# Patient Record
Sex: FEMALE | Race: WHITE | NOT HISPANIC OR LATINO | Employment: OTHER | URBAN - METROPOLITAN AREA
[De-identification: names, ages, dates, MRNs, and addresses within clinical notes are randomized per-mention and may not be internally consistent; named-entity substitution may affect disease eponyms.]

---

## 2023-04-13 ENCOUNTER — TELEPHONE (OUTPATIENT)
Dept: OTOLARYNGOLOGY | Facility: CLINIC | Age: 68
End: 2023-04-13
Payer: MEDICARE

## 2023-04-14 ENCOUNTER — OFFICE VISIT (OUTPATIENT)
Dept: OTOLARYNGOLOGY | Facility: CLINIC | Age: 68
End: 2023-04-14
Payer: MEDICARE

## 2023-04-14 VITALS — WEIGHT: 164 LBS

## 2023-04-14 DIAGNOSIS — R49.0 HOARSENESS: ICD-10-CM

## 2023-04-14 DIAGNOSIS — D86.89 NASAL SARCOIDOSIS: Primary | ICD-10-CM

## 2023-04-14 PROCEDURE — 31231 PR NASAL ENDOSCOPY, DX: ICD-10-PCS | Mod: S$PBB,,, | Performed by: OTOLARYNGOLOGY

## 2023-04-14 PROCEDURE — 31231 NASAL ENDOSCOPY DX: CPT | Mod: S$PBB,,, | Performed by: OTOLARYNGOLOGY

## 2023-04-14 PROCEDURE — 99204 OFFICE O/P NEW MOD 45 MIN: CPT | Mod: S$PBB,25,, | Performed by: OTOLARYNGOLOGY

## 2023-04-14 PROCEDURE — 99999 PR PBB SHADOW E&M-EST. PATIENT-LVL II: CPT | Mod: PBBFAC,,, | Performed by: OTOLARYNGOLOGY

## 2023-04-14 PROCEDURE — 99999 PR PBB SHADOW E&M-EST. PATIENT-LVL II: ICD-10-PCS | Mod: PBBFAC,,, | Performed by: OTOLARYNGOLOGY

## 2023-04-14 PROCEDURE — 99204 PR OFFICE/OUTPT VISIT, NEW, LEVL IV, 45-59 MIN: ICD-10-PCS | Mod: S$PBB,25,, | Performed by: OTOLARYNGOLOGY

## 2023-04-14 PROCEDURE — 87070 CULTURE OTHR SPECIMN AEROBIC: CPT | Performed by: OTOLARYNGOLOGY

## 2023-04-14 PROCEDURE — 87186 SC STD MICRODIL/AGAR DIL: CPT | Mod: 59 | Performed by: OTOLARYNGOLOGY

## 2023-04-14 PROCEDURE — 31231 NASAL ENDOSCOPY DX: CPT | Mod: PBBFAC,PN | Performed by: OTOLARYNGOLOGY

## 2023-04-14 PROCEDURE — 87077 CULTURE AEROBIC IDENTIFY: CPT | Mod: 59 | Performed by: OTOLARYNGOLOGY

## 2023-04-14 PROCEDURE — 99212 OFFICE O/P EST SF 10 MIN: CPT | Mod: PBBFAC,PN,25 | Performed by: OTOLARYNGOLOGY

## 2023-04-14 RX ORDER — NITROFURANTOIN 25; 75 MG/1; MG/1
CAPSULE ORAL
COMMUNITY

## 2023-04-14 RX ORDER — POTASSIUM CHLORIDE 750 MG/1
TABLET, EXTENDED RELEASE ORAL
COMMUNITY

## 2023-04-14 RX ORDER — LEFLUNOMIDE 20 MG/1
TABLET ORAL
COMMUNITY

## 2023-04-14 RX ORDER — ESTRADIOL 0.1 MG/G
2 CREAM VAGINAL
COMMUNITY

## 2023-04-14 RX ORDER — HYDROCODONE BITARTRATE AND ACETAMINOPHEN 7.5; 325 MG/1; MG/1
TABLET ORAL
COMMUNITY

## 2023-04-14 RX ORDER — ALPRAZOLAM 1 MG/1
TABLET ORAL
COMMUNITY

## 2023-04-14 RX ORDER — GABAPENTIN 300 MG/1
CAPSULE ORAL
COMMUNITY

## 2023-04-14 RX ORDER — MUPIROCIN 20 MG/G
OINTMENT TOPICAL
COMMUNITY

## 2023-04-14 RX ORDER — TERBINAFINE HYDROCHLORIDE 250 MG/1
TABLET ORAL
COMMUNITY

## 2023-04-14 RX ORDER — DULOXETIN HYDROCHLORIDE 60 MG/1
CAPSULE, DELAYED RELEASE ORAL
COMMUNITY
Start: 2023-04-10

## 2023-04-14 RX ORDER — CILOSTAZOL 50 MG/1
TABLET ORAL
COMMUNITY

## 2023-04-14 RX ORDER — TRAMADOL HYDROCHLORIDE 50 MG/1
TABLET ORAL
COMMUNITY
Start: 2022-11-16

## 2023-04-14 RX ORDER — MECLIZINE HYDROCHLORIDE 25 MG/1
TABLET ORAL
COMMUNITY

## 2023-04-14 RX ORDER — ALBUTEROL SULFATE 90 UG/1
2 AEROSOL, METERED RESPIRATORY (INHALATION) EVERY 6 HOURS PRN
COMMUNITY
Start: 2023-01-10

## 2023-04-14 RX ORDER — OXYCODONE AND ACETAMINOPHEN 5; 325 MG/1; MG/1
TABLET ORAL
COMMUNITY

## 2023-04-14 RX ORDER — CYCLOBENZAPRINE HCL 10 MG
TABLET ORAL
COMMUNITY

## 2023-04-14 RX ORDER — DULOXETIN HYDROCHLORIDE 20 MG/1
CAPSULE, DELAYED RELEASE ORAL
COMMUNITY

## 2023-04-14 RX ORDER — TRIAMTERENE/HYDROCHLOROTHIAZID 37.5-25 MG
TABLET ORAL
COMMUNITY

## 2023-04-14 RX ORDER — HYOSCYAMINE SULFATE 0.12 MG/1
TABLET SUBLINGUAL
COMMUNITY

## 2023-04-14 RX ORDER — IBUPROFEN 200 MG
200 TABLET ORAL EVERY 8 HOURS PRN
COMMUNITY

## 2023-04-14 RX ORDER — TRAZODONE HYDROCHLORIDE 100 MG/1
TABLET ORAL
COMMUNITY
Start: 2022-06-29

## 2023-04-14 RX ORDER — METHYLPREDNISOLONE 4 MG/1
TABLET ORAL
COMMUNITY

## 2023-04-14 RX ORDER — BENZONATATE 200 MG/1
CAPSULE ORAL
COMMUNITY

## 2023-04-14 RX ORDER — TRIAMTERENE AND HYDROCHLOROTHIAZIDE 75; 50 MG/1; MG/1
TABLET ORAL
COMMUNITY
Start: 2023-04-10

## 2023-04-14 RX ORDER — MULTIVITAMIN
1 TABLET ORAL EVERY MORNING
COMMUNITY

## 2023-04-14 RX ORDER — DULOXETIN HYDROCHLORIDE 30 MG/1
CAPSULE, DELAYED RELEASE ORAL
COMMUNITY

## 2023-04-14 RX ORDER — ZOLPIDEM TARTRATE 10 MG/1
TABLET ORAL
COMMUNITY

## 2023-04-14 RX ORDER — DOXYCYCLINE 100 MG/1
CAPSULE ORAL
COMMUNITY

## 2023-04-14 RX ORDER — DEXTROMETHORPHAN HYDROBROMIDE, GUAIFENESIN, AND PHENYLEPHRINE HYDROCHLORIDE 17.5; 385; 1 MG/1; MG/1; MG/1
TABLET ORAL
COMMUNITY
Start: 2023-01-11

## 2023-04-14 RX ORDER — PREDNISONE 10 MG/1
TABLET ORAL
COMMUNITY
Start: 2023-03-23

## 2023-04-14 RX ORDER — KETOCONAZOLE 20 MG/G
CREAM TOPICAL
COMMUNITY
Start: 2023-03-24 | End: 2024-03-23

## 2023-04-14 RX ORDER — FERROUS SULFATE 325(65) MG
325 TABLET ORAL
COMMUNITY

## 2023-04-14 RX ORDER — GABAPENTIN 100 MG/1
CAPSULE ORAL
COMMUNITY

## 2023-04-14 RX ORDER — ENOXAPARIN SODIUM 100 MG/ML
INJECTION SUBCUTANEOUS
COMMUNITY

## 2023-04-14 RX ORDER — AMOXICILLIN 500 MG/1
CAPSULE ORAL
COMMUNITY

## 2023-04-14 RX ORDER — HYDROXYCHLOROQUINE SULFATE 200 MG/1
TABLET, FILM COATED ORAL
COMMUNITY

## 2023-04-14 RX ORDER — CONJUGATED ESTROGENS 0.62 MG/G
CREAM VAGINAL
COMMUNITY

## 2023-04-14 NOTE — LETTER
April 14, 2023        Jane Calzada MD  [address] [place] [address] [place] [address]             [place] [facility]  [address]  [facility] [place] [address]  Phone: [phone]  Fax: [phone]   Patient: Mary Bray   MR Number: [id number]   YOB: 1955   Date of Visit: 4/14/2023       Dear Dr. Calzada:    Thank you for referring Mary Bray to me for evaluation. Attached you will find relevant portions of my assessment and plan of care.    If you have questions, please do not hesitate to call me. I look forward to following Mary Bray along with you.    Sincerely,      Abraham George MD            CC    No Recipients    Enclosure

## 2023-04-14 NOTE — PROGRESS NOTES
"Subjective:       Patient ID: Mary Bray is a 67 y.o. female.    Chief Complaint: No chief complaint on file.      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  Multiple surgical procedures were performed to open her sinuses and eventually with quite a bit of effort things settled down she was able to heal open sinuses the right maxillary being the hardest requiring stent placement steroid injections and multiple debridements but it eventually healed and a well open condition and the chronic infection and the active nasal sarcoid seemed to for the most part clear up.  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  That is the case bilaterally and while it is patent it probably interferes with mucus flow more than with airflow although both are impaired I am sure.      She comes in today because she is having a flare-up of the pulmonary aspect of her sarcoidosis and she sees a new pulmonologist at [facility] in [facility], but she just wanted me to examine her and see how things look compared to our past experiences and see if this appears to be any aspect of her flare-up with the pulmonary sarcoid.  She also was identified as having a thyroid nodule and it was considered that she might should see an otolaryngologist for that particular newly identified issue.  The ultrasound results from [facility] are available online with the care anywhere and I have paste them here  "1.3 cm " "moderately suspicious (TR-4) nodule in the right mid thyroid. Does not meet size criteria for FNA at this time. Recommend follow-up in one year to ensure stability"    She does think she lives with a sinus infection she treats her nasal septal perforation with routine application of mupirocin and while that was a problem for a long time she has not had any bleeding from this and a long time.        Objective:      ENT Physical Exam  Constitutional  Appearance: patient appears well-developed, well-nourished and well-groomed,  Communication/Voice: communication appropriate for developmental age; Communication comments: She has mild rough hoarseness and that has been the case for many years since I have been seeing her she never had obvious involvement of her larynx with the sarcoidosis.  Head and Face  Appearance: head appears normal, face appears normal and face appears atraumatic;  Salivary: glands normal;  Ear  Hearing: intact;  Auricles: right auricle normal; left auricle normal;  Ear Canals: right ear canal normal; left ear canal normal;  Tympanic Membranes: right tympanic membrane normal; left tympanic membrane normal;  Nose  External Nose: nares patent bilaterally; external nose normal;  Internal Nose: nasal mucosa normal; bilateral inferior turbinates normal;  Nose comments: She has an anterior septal perforation that is roughly the same size as I remember seeing it perhaps four years ago when I last examined her.  There is a small film of yellow non crusted material of the perimeter but no blood and no prominent infection although I am sure it is colonized.    I spray decongestant in her nose on both sides, Afrin with lidocaine in it.  We used the flexible scope to examine her sinus cavities to see if there is anymore infection that is obvious on anterior rhinoscopy to examine her nasopharynx and also her larynx as part of consideration of her current flare-up of sarcoidosis     The flexible scope was used " 1st and it was passed on the right side.  The most worrisome sinus historically was her right maxillary and it is widely patent surgically the sinus does not have any purulence even in the floor of the sinus and the ethmoids that are surgically patent appear in good condition.  The right side is even more open especially the ethmoids it did not have as much scar vacation from a previous surgeries and likewise it does not appear to have any chronic infection or problems especially given the dramatic issues she lives with 20 years ago or so.      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  Cultures were taken of the material around the nasal septum and a separate culture was taken of the nasopharynx through the left choanal opening and also transorally with a curved Calgi swab into the nasopharynx.    Her laryngeal findings are being documented here as it was a continuous exam including the nasal cavity sinuses nasopharynx and her larynx is relatively normal looking with symmetric movement no significant evidence of lesions or abnormalities.  It looks better than it sounds her hoarseness is relatively mild but long term.  Oral Cavity/Oropharynx  Lips: normal;  Teeth: normal;  Gums: gingiva normal;  Tongue: normal;  Oral mucosa: normal;  Hard palate: normal;  Soft palate: normal;  Tonsils: normal;  Base of Tongue: normal;  Posterior pharyngeal wall: normal;  Neck  Neck: neck normal; neck palpation normal;  Thyroid: thyroid normal;  Lymphatic  Palpation: lymph nodes normal;        Assessment:       1. Nasal sarcoidosis         Plan:          So her sinus and nasal cavities looked pretty good given her history of chronic dramatic infections and obstructions.  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  It could contribute to some of her reactive airway aspects as I am sure it drains down.  Her larynx looks good.

## 2023-04-17 LAB
BACTERIA SPEC AEROBE CULT: ABNORMAL
BACTERIA SPEC AEROBE CULT: ABNORMAL

## 2023-04-19 ENCOUNTER — TELEPHONE (OUTPATIENT)
Dept: OTOLARYNGOLOGY | Facility: CLINIC | Age: 68
End: 2023-04-19
Payer: MEDICARE

## 2023-04-19 RX ORDER — CIPROFLOXACIN 500 MG/1
500 TABLET ORAL 2 TIMES DAILY
Qty: 28 TABLET | Refills: 0 | Status: SHIPPED | OUTPATIENT
Start: 2023-04-19 | End: 2023-05-03

## 2023-04-19 NOTE — PROGRESS NOTES
Please tell Ms. Bray that we grew a bacteria called Pseudomonas out back behind her nose and I am going to treat it for a couple of weeks with Cipro.  Asked her to contact us if things are not some better as far as the postnasal drip in particular is concerned.

## 2023-04-19 NOTE — TELEPHONE ENCOUNTER
----- Message from Abraham George MD sent at 4/19/2023  9:51 AM CDT -----  Please tell Ms. Bray that we grew a bacteria called Pseudomonas out back behind her nose and I am going to treat it for a couple of weeks with Cipro.  Asked her to contact us if things are not some better as far as the postnasal drip in particular is concerned.

## 2024-06-28 ENCOUNTER — OFFICE VISIT (OUTPATIENT)
Dept: OTOLARYNGOLOGY | Facility: CLINIC | Age: 69
End: 2024-06-28
Payer: MEDICARE

## 2024-06-28 VITALS — HEIGHT: 60 IN | BODY MASS INDEX: 30 KG/M2 | WEIGHT: 152.81 LBS

## 2024-06-28 DIAGNOSIS — J32.9 CHRONIC SINUSITIS, UNSPECIFIED LOCATION: Primary | ICD-10-CM

## 2024-06-28 PROCEDURE — 87070 CULTURE OTHR SPECIMN AEROBIC: CPT | Performed by: OTOLARYNGOLOGY

## 2024-06-28 PROCEDURE — 99214 OFFICE O/P EST MOD 30 MIN: CPT | Mod: PBBFAC,PN | Performed by: OTOLARYNGOLOGY

## 2024-06-28 PROCEDURE — 99999 PR PBB SHADOW E&M-EST. PATIENT-LVL IV: CPT | Mod: PBBFAC,,, | Performed by: OTOLARYNGOLOGY

## 2024-06-28 RX ORDER — SULFAMETHOXAZOLE AND TRIMETHOPRIM 800; 160 MG/1; MG/1
1 TABLET ORAL 2 TIMES DAILY
Qty: 28 TABLET | Refills: 0 | Status: SHIPPED | OUTPATIENT
Start: 2024-06-28 | End: 2024-07-12

## 2024-06-28 NOTE — PROGRESS NOTES
Subjective:       Patient ID: Mary Bray is a 69 y.o. female.    Chief Complaint: Sinusitis (Pt c/o congestion, drainage and post nasal drip for a week )      This patient is very well known to me from encountered we had over the past decades in the context of her having a fairly dramatic problem with sinonasal sarcoidosis.  Finally with surgical intervention medications time things have settled down and she actually has not had a sinus infection for many years.  She is developing some bleeding on the left and the sensation that is reminiscent to her of when she had such dramatic sinus infections.  I do not have those records available because for the most part they were not even in an electronic medical record format in [facility] but I remember her well staph was often grown out although I think we had Pseudomonas on a few occasions and I am glad that she is done so well over the years and feel flatter that she has chosen to drive from [facility] to see me here.    She has multiple health problems including continued monitoring of perihilar sarcoid nodules, lymphedema that is related to previous sarcoid flare-ups, and more recently a new onset of panic attacks that we are not a problem for her in years past.          Objective:      ENT Physical Exam    So on getting to the office she had something of a panic attack and with some help with her  who has a  and a very good counselor and  that is seems she settled down after a few minutes.  We discussed this.      Her nasal exam shows the previous sequela of her condition including hypotrophic nasal surfaces a perforation in her septum from the sarcoidosis and while it did not do a scope exam she has widely extra painted sinus cavities bilaterally from surgeries I did decades ago.    I obtained a culture from her left side in the ethmoid cavity in the maxillary sinus robb antrostomy because she is grown out  resistant bacteria in years past although that has been a long time ago.        Assessment:       1. Chronic sinusitis, unspecified location         Plan:          Since I do know she grew out staph very frequently in the past I just began her on Bactrim until the culture results come back next week we will discuss this either through the portal or by cell phone and follow up with her progress that way for now

## 2024-08-20 ENCOUNTER — TELEPHONE (OUTPATIENT)
Dept: OTOLARYNGOLOGY | Facility: CLINIC | Age: 69
End: 2024-08-20
Payer: MEDICARE

## 2024-08-20 RX ORDER — SULFAMETHOXAZOLE AND TRIMETHOPRIM 800; 160 MG/1; MG/1
1 TABLET ORAL 2 TIMES DAILY
Qty: 42 TABLET | Refills: 0 | Status: SHIPPED | OUTPATIENT
Start: 2024-08-20 | End: 2024-09-10

## 2024-08-20 NOTE — TELEPHONE ENCOUNTER
----- Message from Abraham George MD sent at 8/20/2024 10:04 AM CDT -----  Regarding: Antibiotic request  I sent in antibiotics for her as she requested  ----- Message -----  From: Kalina Eldridge MA  Sent: 8/19/2024   1:21 PM CDT  To: Abraham George MD    Do pt need to come in for rx to be sent?  ----- Message -----  From: Adia Cummings  Sent: 8/19/2024  11:40 AM CDT  To: Ariel Rosario Staff    Type: Needs Medical Advice  Who Called:  pt  Symptoms (please be specific):  sinus inf  How long has patient had these symptoms:  2 days  Pharmacy name and phone #:    [facility] - [place], [place] - [address]  [address]  [place] [address]  Phone: [phone] Fax: [phone]      Best Call Back Number: [phone]    Additional Information: Pt is asking for a script for bactrum to be sent to her pharmacy for sinus inf    Please call to advise